# Patient Record
Sex: MALE | Race: OTHER
[De-identification: names, ages, dates, MRNs, and addresses within clinical notes are randomized per-mention and may not be internally consistent; named-entity substitution may affect disease eponyms.]

---

## 2018-06-28 ENCOUNTER — HOSPITAL ENCOUNTER (OUTPATIENT)
Dept: HOSPITAL 62 - SC | Age: 73
Discharge: HOME | End: 2018-06-28
Attending: OPHTHALMOLOGY
Payer: MEDICARE

## 2018-06-28 DIAGNOSIS — F17.210: ICD-10-CM

## 2018-06-28 DIAGNOSIS — I10: ICD-10-CM

## 2018-06-28 DIAGNOSIS — H25.13: Primary | ICD-10-CM

## 2018-06-28 DIAGNOSIS — Z79.899: ICD-10-CM

## 2018-06-28 PROCEDURE — 66984 XCAPSL CTRC RMVL W/O ECP: CPT

## 2018-06-28 PROCEDURE — V2632 POST CHMBR INTRAOCULAR LENS: HCPCS

## 2018-06-28 RX ADMIN — TETRACAINE HYDROCHLORIDE PRN DROP: 5 SOLUTION OPHTHALMIC at 11:57

## 2018-06-28 RX ADMIN — CYCLOPENTOLATE HYDROCHLORIDE AND PHENYLEPHRINE HYDROCHLORIDE PRN DROP: 2; 10 SOLUTION/ DROPS OPHTHALMIC at 12:06

## 2018-06-28 RX ADMIN — BESIFLOXACIN PRN DROP: 6 SUSPENSION OPHTHALMIC at 12:23

## 2018-06-28 RX ADMIN — BESIFLOXACIN PRN DROP: 6 SUSPENSION OPHTHALMIC at 11:58

## 2018-06-28 RX ADMIN — CYCLOPENTOLATE HYDROCHLORIDE AND PHENYLEPHRINE HYDROCHLORIDE PRN DROP: 2; 10 SOLUTION/ DROPS OPHTHALMIC at 12:22

## 2018-06-28 RX ADMIN — TROPICAMIDE PRN DROP: 10 SOLUTION/ DROPS OPHTHALMIC at 12:22

## 2018-06-28 RX ADMIN — TETRACAINE HYDROCHLORIDE PRN DROP: 5 SOLUTION OPHTHALMIC at 12:26

## 2018-06-28 RX ADMIN — CYCLOPENTOLATE HYDROCHLORIDE AND PHENYLEPHRINE HYDROCHLORIDE PRN DROP: 2; 10 SOLUTION/ DROPS OPHTHALMIC at 11:58

## 2018-06-28 RX ADMIN — BESIFLOXACIN PRN DROP: 6 SUSPENSION OPHTHALMIC at 12:50

## 2018-06-28 RX ADMIN — TROPICAMIDE PRN DROP: 10 SOLUTION/ DROPS OPHTHALMIC at 12:06

## 2018-06-28 RX ADMIN — TROPICAMIDE PRN DROP: 10 SOLUTION/ DROPS OPHTHALMIC at 11:57

## 2018-06-28 RX ADMIN — TETRACAINE HYDROCHLORIDE PRN DROP: 5 SOLUTION OPHTHALMIC at 12:23

## 2018-06-28 NOTE — [facility] DISCHARGE SUMMARY E
[facility] Discharge Summary



NAME: JASON SMITH

                                      MRN: [id number]

                                AGE: 73Y

ADMITTED: 06/28/2018           DISCHARGED: 06/28/2018





HOSPITAL COURSE:

This is a 73-year-old patient who underwent cataract extraction of the left

eye.



DIAGNOSIS:

CATARACT, LEFT EYE.



He underwent surgery because he was having difficulty seeing road signs.



DISCHARGE INSTRUCTIONS:

He should be on a regular diet.  No bending at his waist, no heavy lifting.

He should use his Besivance, Ilevro, and Durezol at 3 p.m. and 8 p.m. and

sleep with a rigid shield.  I will see him for his 1 day postoperative

tomorrow.







DICTATING PHYSICIAN: YOHANA MCNULTY M.D.



[id number]              DT: 06/28/2018 1848

PHY#: [id number]         DD: 06/28/2018 1816

ID:   [id number]               JOB#: [id number]       ACCT: [id number]



cc:YOHANA MCNULTY M.D.

>

## 2018-06-28 NOTE — [facility] OPERATIVE REPORT E
[facility] Operative Report



NAME: JASON SMITH

                                      MRN: [id number]

                             AGE: 73Y

DATE OF SURGERY: 06/28/2018           ROOM:





 

PREOPERATIVE DIAGNOSIS:

CATARACT, LEFT EYE.



POSTOPERATIVE DIAGNOSIS:

CATARACT, LEFT EYE.



OPERATION:

Cataract extraction with insertion of an IOL of the left eye.



SURGEON:

YOHANA MCNULTY M.D.



ANESTHESIA:

Topical.



PROCEDURE:

After obtaining appropriate consent, the patient's left eye was prepped and

draped in sterile fashion as well as the surgeon in a sterile manner and

cataract surgery was started.  First a paracentesis blade was used to make

a side-port incision.  Viscoelastic was used to inflate the anterior

chamber.  Next a 2.4 mm incision was made with a 2.4 mm blade, clear

corneal temporally.  A continuous capsulorrhexis was made using a cystotome

and Utrata forceps.  Following this hydrodissection was carried out to make

the lens fully loose and mobile and it was rotated 90 degrees.  Following

this, a divide-and-conquer technique was used to phacoemulsify the lens

with a CDE of 8.27. The remaining cortex was removed with

irrigation/aspiration.  Provisc was instilled into the capsular bag to

inflate the bag.  A SN60WF, 19.0 diopter lens was placed.  The remaining

viscoelastic material was removed with irrigation/aspiration.  Following

this, the incision was found to be watertight.  Besivance was instilled

into the eye and a protective shield was placed over the eye.   The patient

returned to the postoperative recovery in stable condition.







DICTATING PHYSICIAN: YOHANA MCNULTY M.D.



5020M              DT: 06/28/2018 1847

PHY#: 2011         DD: 06/28/2018 1816

ID:   [id number]               JOB#: [id number]       ACCT: [id number]



cc:YOHANA MCNULTY M.D.

>

## 2018-07-26 ENCOUNTER — HOSPITAL ENCOUNTER (OUTPATIENT)
Dept: HOSPITAL 62 - SC | Age: 73
Discharge: HOME | End: 2018-07-26
Attending: OPHTHALMOLOGY
Payer: MEDICARE

## 2018-07-26 DIAGNOSIS — H25.11: Primary | ICD-10-CM

## 2018-07-26 DIAGNOSIS — I10: ICD-10-CM

## 2018-07-26 DIAGNOSIS — Z96.1: ICD-10-CM

## 2018-07-26 DIAGNOSIS — Z79.899: ICD-10-CM

## 2018-07-26 DIAGNOSIS — H40.89: ICD-10-CM

## 2018-07-26 PROCEDURE — 66984 XCAPSL CTRC RMVL W/O ECP: CPT

## 2018-07-26 PROCEDURE — V2632 POST CHMBR INTRAOCULAR LENS: HCPCS

## 2018-07-26 RX ADMIN — BESIFLOXACIN PRN DROP: 6 SUSPENSION OPHTHALMIC at 11:09

## 2018-07-26 RX ADMIN — TROPICAMIDE PRN DROP: 10 SOLUTION/ DROPS OPHTHALMIC at 10:14

## 2018-07-26 RX ADMIN — TETRACAINE HYDROCHLORIDE PRN DROP: 5 SOLUTION OPHTHALMIC at 10:24

## 2018-07-26 RX ADMIN — CYCLOPENTOLATE HYDROCHLORIDE AND PHENYLEPHRINE HYDROCHLORIDE PRN DROP: 2; 10 SOLUTION/ DROPS OPHTHALMIC at 10:14

## 2018-07-26 RX ADMIN — TROPICAMIDE PRN DROP: 10 SOLUTION/ DROPS OPHTHALMIC at 10:04

## 2018-07-26 RX ADMIN — TROPICAMIDE PRN DROP: 10 SOLUTION/ DROPS OPHTHALMIC at 10:24

## 2018-07-26 RX ADMIN — BESIFLOXACIN PRN DROP: 6 SUSPENSION OPHTHALMIC at 10:04

## 2018-07-26 RX ADMIN — CYCLOPENTOLATE HYDROCHLORIDE AND PHENYLEPHRINE HYDROCHLORIDE PRN DROP: 2; 10 SOLUTION/ DROPS OPHTHALMIC at 10:04

## 2018-07-26 RX ADMIN — BESIFLOXACIN PRN DROP: 6 SUSPENSION OPHTHALMIC at 11:07

## 2018-07-26 RX ADMIN — BESIFLOXACIN PRN DROP: 6 SUSPENSION OPHTHALMIC at 10:24

## 2018-07-26 RX ADMIN — TETRACAINE HYDROCHLORIDE PRN DROP: 5 SOLUTION OPHTHALMIC at 10:46

## 2018-07-26 RX ADMIN — TETRACAINE HYDROCHLORIDE PRN DROP: 5 SOLUTION OPHTHALMIC at 10:05

## 2018-07-26 RX ADMIN — CYCLOPENTOLATE HYDROCHLORIDE AND PHENYLEPHRINE HYDROCHLORIDE PRN DROP: 2; 10 SOLUTION/ DROPS OPHTHALMIC at 10:24

## 2018-07-26 NOTE — [facility] DISCHARGE SUMMARY [facility] Discharge Summary



NAME: JASON SMITH

                                      MRN: [id number]

                                AGE: 73Y

ADMITTED: 07/26/2018           DISCHARGED: 07/26/2018



FINAL DIAGNOSIS:

Cataract, right eye.



HOSPITAL COURSE:

This is a 73-year-old male who underwent cataract extraction of the right

eye.  He underwent surgery because he was having glare from headlights.



DISCHARGE INSTRUCTIONS:

He should be on a regular diet.  No bending at his waist, no heavy lifting.

He should use his Besivance, Ilevro and Durezol at 3:00 p.m. and 8:00 p.m. 

Sleep with a rigid shield.  I will see him for a 1-day postoperative

tomorrow.





DICTATING PHYSICIAN: YOHANA MCNULTY M.D.



5233M              DT: 07/26/2018 2037

PHY#: 2011         DD: 07/26/2018 1954

ID:   [id number]               JOB#: [id number]       ACCT: [id number]



cc:YOHANA MCNULTY M.D.

>

## 2018-07-26 NOTE — [facility]PERATIVE REPORT E
[facility] Operative Report



NAME: JASON SMITH

                                      MRN: [id number]

                             AGE: 73Y

DATE OF SURGERY: 07/26/2018                   ROOM:

 

PREOPERATIVE DIAGNOSIS:

CATARACT, RIGHT EYE.



POSTOPERATIVE DIAGNOSIS:

CATARACT, RIGHT EYE.



OPERATION:

Cataract extraction with insertion of an IOL of the right eye.



SURGEON:

YOHANA MCNULTY M.D.



ANESTHESIA:

Topical.



PROCEDURE:

After obtaining appropriate consent, the patient's right eye was prepped

and draped in sterile fashion as well as the surgeon in a sterile manner

and cataract surgery was started.  First a paracentesis blade was used to

make a side-port incision.  Viscoelastic was used to inflate the anterior

chamber.  Next a 2.4 mm incision was made with a 2.4 mm blade, clear

corneal temporally.  A continuous capsulorrhexis was made using a cystotome

and Utrata forceps.  Following this hydrodissection was carried out to make

the lens fully loose and mobile and it was rotated 90 degrees.  Following

this, a divide-and-conquer technique was used to phacoemulsify the lens

with a CDE of 10.38. The remaining cortex was removed with

irrigation/aspiration.  Provisc was instilled into the capsular bag to

inflate the bag.  A SN60WF, 20.0 diopter lens was placed.  The remaining

viscoelastic material was removed with irrigation/aspiration.  Following

this, the incision was found to be watertight.  Besivance was instilled

into the eye and a protective shield was placed over the eye.   The patient

returned to the postoperative recovery in stable condition.





DICTATING PHYSICIAN: YOHANA MCNULTY M.D.



5233M              DT: 07/26/2018 2028

PHY#: 2011         DD: 07/26/2018 1954

ID:   [id number]               JOB#: [id number]       ACCT: [id number]



cc:YOHANA MCNULTY M.D.

>